# Patient Record
Sex: FEMALE | Race: ASIAN
[De-identification: names, ages, dates, MRNs, and addresses within clinical notes are randomized per-mention and may not be internally consistent; named-entity substitution may affect disease eponyms.]

---
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
## 2019-12-14 ENCOUNTER — HOSPITAL ENCOUNTER (EMERGENCY)
Dept: HOSPITAL 25 - UCEAST | Age: 19
Discharge: HOME | End: 2019-12-14
Payer: COMMERCIAL

## 2019-12-14 VITALS — DIASTOLIC BLOOD PRESSURE: 100 MMHG | SYSTOLIC BLOOD PRESSURE: 151 MMHG

## 2019-12-14 DIAGNOSIS — I10: ICD-10-CM
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
## 2019-12-14 DIAGNOSIS — R51: ICD-10-CM

## 2019-12-14 DIAGNOSIS — S16.1XXA: Primary | ICD-10-CM
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
## 2019-12-14 DIAGNOSIS — Z91.013: ICD-10-CM

## 2019-12-14 PROCEDURE — 99212 OFFICE O/P EST SF 10 MIN: CPT

## 2019-12-14 PROCEDURE — G0463 HOSPITAL OUTPT CLINIC VISIT: HCPCS
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
## 2020-01-14 ENCOUNTER — HOSPITAL ENCOUNTER (EMERGENCY)
Dept: HOSPITAL 25 - UCEAST | Age: 20
Discharge: HOME | End: 2020-01-14
Payer: COMMERCIAL

## 2020-01-14 VITALS — SYSTOLIC BLOOD PRESSURE: 133 MMHG | DIASTOLIC BLOOD PRESSURE: 78 MMHG

## 2020-01-14 DIAGNOSIS — J98.01: Primary | ICD-10-CM

## 2020-01-14 DIAGNOSIS — Z91.013: ICD-10-CM

## 2020-01-14 PROCEDURE — 99213 OFFICE O/P EST LOW 20 MIN: CPT

## 2020-01-14 PROCEDURE — 93005 ELECTROCARDIOGRAM TRACING: CPT

## 2020-01-14 PROCEDURE — 71046 X-RAY EXAM CHEST 2 VIEWS: CPT

## 2020-01-14 PROCEDURE — G0463 HOSPITAL OUTPT CLINIC VISIT: HCPCS

## 2020-01-14 NOTE — UC
Respiratory Complaint HPI





- HPI Summary


HPI Summary: 





The patient is a 19-year-old female that had her wisdom teeth taken out about a 

week ago.  She is currently on amoxicillin for that.  She presents here with 

the onset of chest tightness that started yesterday.  She has been coughing.  

She states that if she takes a deep breath in that triggers a coughing spell. 

If she talks it triggers coughing. She denies any fever or chills.  She denies 

any nausea vomiting or diarrhea.  She denies any abdominal pain.  She has no 

history of asthma bronchitis or pneumonia.








For weeks she has intermittent trouble swallowing





she states it feel like she has a mass pressing on the right side 





recent CBC and TSH normal per patient





she has been on motrin three x daily





occasional heart burn

















- History of Current Complaint


Chief Complaint: UCRespiratory


Stated Complaint: COUGH CHEST FEELS TIGHT


Time Seen by Provider: 01/14/20 12:15


Hx Obtained From: Patient


Hx Last Menstrual Period: January 1, 2020


Onset/Duration: Gradual Onset, Lasting Hours


Timing: Constant


Severity Currently: Mild


Pain Intensity: 4


Pain Scale Used: 0-10 Numeric


Character: Cough: Nonproductive


Aggravating Factors: Deep Breaths


Alleviating Factors: Nothing


Associated Signs And Symptoms: Negative: Dyspnea, Fever, Chills, Pleuritic 

Chest Pain, Wheezing, Hemoptysis, Dizziness, Calf Pain, Calf Swelling, Edema, 

URI, Nasal Congestion, Hoarseness, Sinus Discomfort





- Allergies/Home Medications


Allergies/Adverse Reactions: 


 Allergies











Allergy/AdvReac Type Severity Reaction Status Date / Time


 


shellfish derived Allergy  Rash Verified 01/14/20 11:28











Home Medications: 


 Home Medications





Amoxicillin PO (*) [Amoxicillin 500 MG CAP*] 1 tab PO TID 01/14/20 [History 

Confirmed 01/14/20]


Ibuprofen TAB* [Motrin TAB* 600 MG] 1 tab PO Q6HR 01/14/20 [History Confirmed 01 /14/20]











PMH/Surg Hx/FS Hx/Imm Hx


Previously Healthy: Yes


Other History Of: 


   Negative For: Anticoagulant Therapy





- Surgical History


Surgical History: None





- Family History


Known Family History: Positive: Hypertension, Other - colon Ca


   Negative: Respiratory Disease


Family History: dyslipidemia





- Social History


Alcohol Use: Occasionally


Substance Use Type: None


Smoking Status (MU): Never Smoked Tobacco





- Immunization History


Vaccination Up to Date: Yes





Review of Systems


All Other Systems Reviewed And Are Negative: Yes


Constitutional: Positive: Negative


Skin: Positive: Negative


Eyes: Positive: Negative


ENT: Negative: Epistaxis, Dental Pain, Sore Throat, Ear Ache, Nasal Discharge, 

Sinus Congestion, Sinus Pain/Tenderness


Respiratory: Positive: Cough


Cardiovascular: Positive: Other - chest tightness


Motor: Positive: Negative


Neurovascular: Positive: Negative


Musculoskeletal: Positive: Negative


Neurological: Positive: Negative


Psychological: Positive: Negative





Physical Exam


Triage Information Reviewed: Yes


Appearance: Well-Appearing, No Pain Distress, Well-Nourished


Vital Signs: 


 Initial Vital Signs











Temp  98.6 F   01/14/20 11:25


 


Pulse  104   01/14/20 11:25


 


Resp  18   01/14/20 11:25


 


BP  145/79   01/14/20 11:25


 


Pulse Ox  99   01/14/20 11:25











Vital Signs Reviewed: Yes


Eyes: Positive: Conjunctiva Clear


ENT: Positive: Pharynx normal, Uvula midline.  Negative: Hearing grossly normal

, Pharyngeal erythema, Nasal congestion, Nasal drainage, Tonsillar swelling, 

Tonsillar exudate, Trismus, Muffled voice, Hoarse voice, Sinus tenderness


Dental Exam: Normal


Dental: Positive: Other: - no dry sockets


Neck: Positive: Supple, Nontender, No Lymphadenopathy


Respiratory: Positive: No respiratory distress, No accessory muscle use, 

Wheezing - with forced exp, Other: - bronchospastic cough


Cardiovascular: Positive: RRR, No Murmur


Musculoskeletal: Positive: ROM Intact, No Edema


Neurological: Positive: Alert


Psychological Exam: Normal


Skin Exam: Normal





Diagnostics





- Radiology


  ** No standard instances


Radiology Interpretation Completed By: Radiologist


Summary of Radiographic Findings: NAD





- EKG


Cardiac Rate: NL


Cardiac Rhythm: Sinus: Normal


Ectopy: None


ST Segment: Non-Specific





Re-Evaluation





- Re-Evaluation


  ** First Eval


Re-Evaluation Time: 14:02


Change: Unchanged - She says she feels the same but seems to have better air 

movement.  Still with a bronchospastic cough





Respiratory Course/Dx





- Differential Dx/Diagnosis


Provider Diagnosis: 


 Bronchospasm








Discharge ED





- Sign-Out/Discharge


Documenting (check all that apply): Patient Departure


All imaging exams completed and their final reports reviewed: Yes





- Discharge Plan


Condition: Stable


Disposition: HOME


Prescriptions: 


Famotidine TAB* [Pepcid 20 MG TAB*] 20 mg PO DAILY #14 tab


predniSONE 20 mg TAB [Deltasone 20 MG TAB*] 40 mg PO DAILY #8 tab


Patient Education Materials:  Bronchospasm (ED), How to Use a Metered-Dose 

Inhaler and a Spacer (ED)


Referrals: 


[facility] [Outside] - 2 Days


(recheck in 2-3 days


)


Cryer,Jonathan, MD [Medical Doctor] - 2 Weeks


Additional Instructions: 


Your chest sounds tight and you have some wheezing





Use your inhaler as directed











If symptoms worsen you need to go to the ER








Follow up with care [facility] later this week for your cough/wheezing





Call the ENT about your right sided throat issue











- Billing Disposition and Condition


Condition: STABLE


Disposition: Home